# Patient Record
Sex: FEMALE | Race: OTHER | Employment: UNEMPLOYED | URBAN - NONMETROPOLITAN AREA
[De-identification: names, ages, dates, MRNs, and addresses within clinical notes are randomized per-mention and may not be internally consistent; named-entity substitution may affect disease eponyms.]

---

## 2022-08-10 ENCOUNTER — HOSPITAL ENCOUNTER (EMERGENCY)
Age: 37
Discharge: HOME | End: 2022-08-11
Attending: EMERGENCY MEDICINE
Payer: MEDICAID

## 2022-08-10 DIAGNOSIS — L08.9 INFECTED INSECT BITE OF LEFT FOREARM, INITIAL ENCOUNTER: Primary | ICD-10-CM

## 2022-08-10 DIAGNOSIS — S50.862A INFECTED INSECT BITE OF LEFT FOREARM, INITIAL ENCOUNTER: Primary | ICD-10-CM
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
## 2022-08-10 PROCEDURE — 99284 EMERGENCY DEPT VISIT MOD MDM: CPT

## 2022-08-10 PROCEDURE — 96372 THER/PROPH/DIAG INJ SC/IM: CPT

## 2022-08-10 RX ORDER — BUSPIRONE HYDROCHLORIDE 15 MG/1
15 TABLET ORAL 3 TIMES DAILY
COMMUNITY

## 2022-08-10 ASSESSMENT — PAIN - FUNCTIONAL ASSESSMENT: PAIN_FUNCTIONAL_ASSESSMENT: NONE - DENIES PAIN

## 2022-08-11 VITALS
DIASTOLIC BLOOD PRESSURE: 100 MMHG | TEMPERATURE: 97.8 F | BODY MASS INDEX: 34.15 KG/M2 | HEART RATE: 112 BPM | SYSTOLIC BLOOD PRESSURE: 150 MMHG | OXYGEN SATURATION: 96 % | HEIGHT: 64 IN | WEIGHT: 200 LBS | RESPIRATION RATE: 18 BRPM

## 2022-08-11 PROCEDURE — 6370000000 HC RX 637 (ALT 250 FOR IP): Performed by: EMERGENCY MEDICINE

## 2022-08-11 PROCEDURE — 6360000002 HC RX W HCPCS: Performed by: EMERGENCY MEDICINE

## 2022-08-11 RX ORDER — PREDNISONE 20 MG/1
40 TABLET ORAL DAILY
Qty: 10 TABLET | Refills: 0 | Status: SHIPPED | OUTPATIENT
Start: 2022-08-11 | End: 2022-08-16

## 2022-08-11 RX ORDER — DIPHENHYDRAMINE HCL 25 MG
25-50 TABLET ORAL EVERY 6 HOURS PRN
Qty: 30 TABLET | Refills: 0 | Status: SHIPPED | OUTPATIENT
Start: 2022-08-11 | End: 2022-08-18

## 2022-08-11 RX ORDER — DOXYCYCLINE HYCLATE 100 MG
100 TABLET ORAL 2 TIMES DAILY
Qty: 20 TABLET | Refills: 0 | Status: SHIPPED | OUTPATIENT
Start: 2022-08-11 | End: 2022-08-21

## 2022-08-11 RX ORDER — DIPHENHYDRAMINE HYDROCHLORIDE 50 MG/ML
50 INJECTION INTRAMUSCULAR; INTRAVENOUS ONCE
Status: COMPLETED | OUTPATIENT
Start: 2022-08-11 | End: 2022-08-11

## 2022-08-11 RX ORDER — LORATADINE 10 MG/1
10 TABLET ORAL DAILY
Qty: 30 TABLET | Refills: 0 | Status: SHIPPED | OUTPATIENT
Start: 2022-08-11 | End: 2022-08-21

## 2022-08-11 RX ORDER — DOXYCYCLINE HYCLATE 100 MG/1
100 CAPSULE ORAL ONCE
Status: COMPLETED | OUTPATIENT
Start: 2022-08-11 | End: 2022-08-11

## 2022-08-11 RX ORDER — DIPHENHYDRAMINE HYDROCHLORIDE 50 MG/ML
50 INJECTION INTRAMUSCULAR; INTRAVENOUS ONCE
Status: DISCONTINUED | OUTPATIENT
Start: 2022-08-11 | End: 2022-08-11

## 2022-08-11 RX ADMIN — DOXYCYCLINE HYCLATE 100 MG: 100 CAPSULE ORAL at 00:44

## 2022-08-11 RX ADMIN — PREDNISONE 50 MG: 20 TABLET ORAL at 00:44

## 2022-08-11 RX ADMIN — DIPHENHYDRAMINE HYDROCHLORIDE 50 MG: 50 INJECTION, SOLUTION INTRAMUSCULAR; INTRAVENOUS at 00:44

## 2022-08-11 ASSESSMENT — ENCOUNTER SYMPTOMS
SHORTNESS OF BREATH: 0
VOMITING: 0
COUGH: 0
VOICE CHANGE: 0
DIARRHEA: 0
EYE REDNESS: 0
ABDOMINAL PAIN: 0
EYE PAIN: 0
RHINORRHEA: 0

## 2022-08-11 NOTE — ED PROVIDER NOTES
[facility] EMERGENCY DEPT  EMERGENCY DEPARTMENT ENCOUNTER      Pt Name: Yoan Kline  MRN: [id number]  [place] 1985  Date of evaluation: 8/10/2022  Provider: Michelle Velasco MD    CHIEF COMPLAINT       Chief Complaint   Patient presents with    Insect Bite     On left forearm, pt noticed this morning. Pain and swelling has gotten worse. HISTORY OF PRESENT ILLNESS   (Location/Symptom, Timing/Onset,Context/Setting, Quality, Duration, Modifying Factors, Severity)  Note limiting factors. Yoan Kline is a 44 y.o. female who presents to the emergency department with complaint of redness, swelling, and itching to the left forearm. Says it was present when she woke up this morning. Started with a specific area that looked like an insect bite initially and is progressed since then. Has not had any fevers or other systemic symptoms. Denies any chronic medical history such as diabetes. HPI    NursingNotes were reviewed. REVIEW OF SYSTEMS    (2-9 systems for level 4, 10 or more for level 5)     Review of Systems   Constitutional:  Negative for fatigue and fever. HENT:  Negative for congestion, rhinorrhea and voice change. Eyes:  Negative for pain and redness. Respiratory:  Negative for cough and shortness of breath. Cardiovascular:  Negative for chest pain. Gastrointestinal:  Negative for abdominal pain, diarrhea and vomiting. Endocrine: Negative. Genitourinary: Negative. Musculoskeletal:  Negative for arthralgias and gait problem. Skin:  Positive for rash. Negative for wound. Neurological:  Negative for weakness and headaches. Hematological: Negative. Psychiatric/Behavioral: Negative. All other systems reviewed and are negative. A complete review of systems was performed and is negative except as noted above in the HPI. PAST MEDICAL HISTORY     Past Medical History:   Diagnosis Date    Anxiety     Depression          SURGICAL HISTORY     History reviewed. No pertinent surgical history. CURRENT MEDICATIONS       Discharge Medication List as of 8/11/2022 12:40 AM        CONTINUE these medications which have NOT CHANGED    Details   busPIRone (BUSPAR) 15 MG tablet Take 15 mg by mouth in the morning and 15 mg at noon and 15 mg before bedtime. Historical Med      buPROPion HCl (WELLBUTRIN PO) Take by mouthHistorical Med             ALLERGIES     Geodon [ziprasidone]    FAMILY HISTORY     History reviewed. No pertinent family history. SOCIAL HISTORY       Social History     Socioeconomic History    Marital status: Single     Spouse name: None    Number of children: None    Years of education: None    Highest education level: None   Tobacco Use    Smoking status: Every Day     Types: Cigarettes   Substance and Sexual Activity    Alcohol use: Yes    Drug use: Never       SCREENINGS    Cara Coma Scale  Eye Opening: Spontaneous  Best Verbal Response: Oriented  Best Motor Response: Obeys commands  Cara Coma Scale Score: 15        PHYSICAL EXAM    (up to 7 for level 4, 8 or more for level 5)     ED Triage Vitals [08/10/22 2358]   BP Temp Temp Source Heart Rate Resp SpO2 Height Weight   (!) 156/100 97.8 °F (36.6 °C) Temporal (!) 117 18 96 % 5' 4\" (1.626 m) 200 lb (90.7 kg)       Physical Exam  Vitals and nursing note reviewed. Constitutional:       General: She is not in acute distress. Appearance: She is well-developed. She is not toxic-appearing or diaphoretic. HENT:      Head: Normocephalic and atraumatic. Mouth/Throat:      Mouth: Mucous membranes are moist.      Pharynx: Oropharynx is clear. Eyes:      General: No scleral icterus. Right eye: No discharge. Left eye: No discharge. Pupils: Pupils are equal, round, and reactive to light. Cardiovascular:      Rate and Rhythm: Regular rhythm. Tachycardia present. Pulmonary:      Effort: Pulmonary effort is normal. No respiratory distress. Breath sounds: No stridor. Abdominal:      General: There is no distension. Musculoskeletal:         General: No deformity. Normal range of motion. Cervical back: Normal range of motion. Skin:     General: Skin is warm and dry. Comments: Approximately 7 to 8 cm of erythema to the left volar forearm with some excoriations. In the center there is an area that appears consistent with an insect bite with 2 distinct small punctate lesions [id number]. No palpable fluctuance or drainable fluid collection. Neurological:      General: No focal deficit present. Mental Status: She is alert and oriented to person, place, and time. GCS: GCS eye subscore is 4. GCS verbal subscore is 5. GCS motor subscore is 6. Cranial Nerves: No cranial nerve deficit. Motor: No abnormal muscle tone. Psychiatric:         Mood and Affect: Mood is anxious. Behavior: Behavior normal.         Thought Content: Thought content normal.         Judgment: Judgment normal.       DIAGNOSTIC RESULTS     EKG: All EKG's are interpreted by the Emergency Department Physician who either signs or Co-signs this chart in the absence of a cardiologist.      RADIOLOGY:   Non-plain film images such as CT, Ultrasound and MRI are read by the radiologist. Plainradiographic images are visualized and preliminarily interpreted by the emergency physician with the below findings:        Interpretation per the Radiologist below, if available at the time of this note:    No orders to display         ED BEDSIDE ULTRASOUND:   Performed by ED Physician - none    LABS:  Labs Reviewed - No data to display    All other labs were within normal range or not returned as of this dictation.     Medications   predniSONE (DELTASONE) tablet 50 mg (50 mg Oral Given 8/11/22 0044)   doxycycline hyclate (VIBRAMYCIN) capsule 100 mg (100 mg Oral Given 8/11/22 0044)   diphenhydrAMINE (BENADRYL) injection 50 mg (50 mg IntraMUSCular Given 8/11/22 0044)       EMERGENCY DEPARTMENT COURSE and DIFFERENTIALDIAGNOSIS/MDM:   Vitals:    Vitals:    08/10/22 2358 08/11/22 0051   BP: (!) 156/100 (!) 150/100   Pulse: (!) 117 (!) 112   Resp: 18 18   Temp: 97.8 °F (36.6 °C)    TempSrc: Temporal    SpO2: 96%    Weight: 200 lb (90.7 kg)    Height: 5' 4\" (1.626 m)        MDM    History and physical exam are actually consistent with a true spider bite. There is surrounding erythema and area is pruritic, suggesting more of an inflammatory/hypersensitivity response, though given timeframe and gradual worsening could also represent some component of developing cellulitis/infection. No signs of abscess or drainable fluid collection. Plan to treat simultaneously for both with antibiotics as well as steroids and antihistamines. Evaluation and work-up here revealed no signs of emergent or life-threatening pathology that would necessitate admission for further work-up or management at this time. Patient is felt to be stable for discharge home with return precautions for worsening of the condition or development of new concerning symptoms. Patient was encouraged to follow-up with their primary care doctor in the appropriate timeframe. Necessary prescriptions and information have been provided for treatment at home. Patient voices understanding and agreement with the plan. CONSULTS:  None    PROCEDURES:  Unless otherwise notedbelow, none     Procedures      FINAL IMPRESSION     1. Infected insect bite of left forearm, initial encounter          DISPOSITION/PLAN   DISPOSITION Decision To Discharge 08/11/2022 12:51:43 AM      No notes of EC Admission Criteria type on file.     PATIENT REFERRED TO:  [facility] EMERGENCY DEPT  Lazaro Rudd  [phone]    If symptoms worsen      DISCHARGE MEDICATIONS:  Discharge Medication List as of 8/11/2022 12:40 AM        START taking these medications    Details   predniSONE (DELTASONE) 20 MG tablet Take 2 tablets by mouth in the morning for 5 days., Disp-10 tablet, R-0Normal      doxycycline hyclate (VIBRA-TABS) 100 MG tablet Take 1 tablet by mouth in the morning and 1 tablet before bedtime. Do all this for 10 days. , Disp-20 tablet, R-0Normal      diphenhydrAMINE (BENADRYL ALLERGY) 25 MG tablet Take 1-2 tablets by mouth every 6 hours as needed for Itching, Disp-30 tablet, R-0Normal                (Please note that portions of this note were completed with a voice recognition program.  Efforts were made to edit the dictations butoccasionally words are mis-transcribed.)    Michelle Velasco MD (electronically signed)  AttendingEmergency Physician          Michelle Velasco., MD  08/11/22 3123

## 2022-08-12 ENCOUNTER — HOSPITAL ENCOUNTER (EMERGENCY)
Age: 37
Discharge: LEFT BEFORE DISCHARGE | End: 2022-08-12

## 2022-08-12 VITALS
OXYGEN SATURATION: 95 % | SYSTOLIC BLOOD PRESSURE: 146 MMHG | RESPIRATION RATE: 15 BRPM | HEART RATE: 115 BPM | DIASTOLIC BLOOD PRESSURE: 91 MMHG | TEMPERATURE: 98.3 F | BODY MASS INDEX: 34.15 KG/M2 | WEIGHT: 200 LBS | HEIGHT: 64 IN

## 2022-08-12 ASSESSMENT — PAIN - FUNCTIONAL ASSESSMENT: PAIN_FUNCTIONAL_ASSESSMENT: 0-10

## 2022-08-12 ASSESSMENT — PAIN SCALES - GENERAL: PAINLEVEL_OUTOF10: 7

## 2022-08-12 ASSESSMENT — PAIN DESCRIPTION - DESCRIPTORS: DESCRIPTORS: TIGHTNESS

## 2022-08-12 ASSESSMENT — PAIN DESCRIPTION - LOCATION: LOCATION: FOOT

## 2022-08-12 ASSESSMENT — PAIN DESCRIPTION - ORIENTATION: ORIENTATION: RIGHT;LEFT

## 2022-08-14 ENCOUNTER — APPOINTMENT (OUTPATIENT)
Dept: GENERAL RADIOLOGY | Facility: HOSPITAL | Age: 37
End: 2022-08-14

## 2022-08-14 ENCOUNTER — HOSPITAL ENCOUNTER (EMERGENCY)
Facility: HOSPITAL | Age: 37
Discharge: HOME | End: 2022-08-14
Attending: STUDENT IN AN ORGANIZED HEALTH CARE EDUCATION/TRAINING PROGRAM | Admitting: STUDENT IN AN ORGANIZED HEALTH CARE EDUCATION/TRAINING PROGRAM

## 2022-08-14 VITALS
RESPIRATION RATE: 18 BRPM | BODY MASS INDEX: 32.44 KG/M2 | TEMPERATURE: 98.8 F | DIASTOLIC BLOOD PRESSURE: 78 MMHG | SYSTOLIC BLOOD PRESSURE: 118 MMHG | WEIGHT: 190 LBS | HEART RATE: 94 BPM | OXYGEN SATURATION: 100 % | HEIGHT: 64 IN

## 2022-08-14 DIAGNOSIS — F17.200 SMOKER: ICD-10-CM

## 2022-08-14 DIAGNOSIS — R07.9 CHEST PAIN, UNSPECIFIED TYPE: ICD-10-CM

## 2022-08-14 DIAGNOSIS — N30.00 ACUTE CYSTITIS WITHOUT HEMATURIA: ICD-10-CM

## 2022-08-14 DIAGNOSIS — R06.02 SHORTNESS OF BREATH: ICD-10-CM

## 2022-08-14 DIAGNOSIS — M79.89 LEG SWELLING: Primary | ICD-10-CM

## 2022-08-14 LAB
ANION GAP SERPL CALCULATED.3IONS-SCNC: 12 MMOL/L (ref 5–15)
B-HCG UR QL: NEGATIVE
BACTERIA UR QL AUTO: ABNORMAL /HPF
BILIRUB UR QL STRIP: NEGATIVE
BUN SERPL-MCNC: 26 MG/DL (ref 6–20)
BUN/CREAT SERPL: 28.6 (ref 7–25)
CALCIUM SPEC-SCNC: 9.1 MG/DL (ref 8.6–10.5)
CHLORIDE SERPL-SCNC: 99 MMOL/L (ref 98–107)
CLARITY UR: ABNORMAL
CO2 SERPL-SCNC: 25 MMOL/L (ref 22–29)
COLOR UR: YELLOW
CREAT SERPL-MCNC: 0.91 MG/DL (ref 0.57–1)
DEPRECATED RDW RBC AUTO: 45.1 FL (ref 37–54)
EGFRCR SERPLBLD CKD-EPI 2021: 83 ML/MIN/1.73
ERYTHROCYTE [DISTWIDTH] IN BLOOD BY AUTOMATED COUNT: 13.8 % (ref 12.3–15.4)
GLUCOSE SERPL-MCNC: 102 MG/DL (ref 65–99)
GLUCOSE UR STRIP-MCNC: NEGATIVE MG/DL
HCT VFR BLD AUTO: 36.4 % (ref 34–46.6)
HGB BLD-MCNC: 12.3 G/DL (ref 12–15.9)
HGB UR QL STRIP.AUTO: NEGATIVE
HYALINE CASTS UR QL AUTO: ABNORMAL /LPF
KETONES UR QL STRIP: NEGATIVE
LEUKOCYTE ESTERASE UR QL STRIP.AUTO: ABNORMAL
LYMPHOCYTES # BLD MANUAL: 5.57 10*3/MM3 (ref 0.7–3.1)
LYMPHOCYTES NFR BLD MANUAL: 1 % (ref 5–12)
MCH RBC QN AUTO: 30.3 PG (ref 26.6–33)
MCHC RBC AUTO-ENTMCNC: 33.8 G/DL (ref 31.5–35.7)
MCV RBC AUTO: 89.7 FL (ref 79–97)
MONOCYTES # BLD: 0.11 10*3/MM3 (ref 0.1–0.9)
NEUTROPHILS # BLD AUTO: 5.8 10*3/MM3 (ref 1.7–7)
NEUTROPHILS NFR BLD MANUAL: 50.5 % (ref 42.7–76)
NEUTS VAC BLD QL SMEAR: ABNORMAL
NITRITE UR QL STRIP: NEGATIVE
NT-PROBNP SERPL-MCNC: 35.3 PG/ML (ref 0–450)
PH UR STRIP.AUTO: 6 [PH] (ref 5–8)
PLAT MORPH BLD: NORMAL
PLATELET # BLD AUTO: 255 10*3/MM3 (ref 140–450)
PMV BLD AUTO: 10.1 FL (ref 6–12)
POTASSIUM SERPL-SCNC: 3.4 MMOL/L (ref 3.5–5.2)
PROT UR QL STRIP: ABNORMAL
RBC # BLD AUTO: 4.06 10*6/MM3 (ref 3.77–5.28)
RBC # UR STRIP: ABNORMAL /HPF
RBC MORPH BLD: NORMAL
REF LAB TEST METHOD: ABNORMAL
SODIUM SERPL-SCNC: 136 MMOL/L (ref 136–145)
SP GR UR STRIP: 1.02 (ref 1–1.03)
SQUAMOUS #/AREA URNS HPF: ABNORMAL /HPF
TROPONIN T SERPL-MCNC: <0.01 NG/ML (ref 0–0.03)
UROBILINOGEN UR QL STRIP: ABNORMAL
VARIANT LYMPHS NFR BLD MANUAL: 48.5 % (ref 19.6–45.3)
WBC # UR STRIP: ABNORMAL /HPF
WBC NRBC COR # BLD: 11.48 10*3/MM3 (ref 3.4–10.8)

## 2022-08-14 PROCEDURE — 71046 X-RAY EXAM CHEST 2 VIEWS: CPT

## 2022-08-14 PROCEDURE — 93005 ELECTROCARDIOGRAM TRACING: CPT

## 2022-08-14 PROCEDURE — 81025 URINE PREGNANCY TEST: CPT | Performed by: STUDENT IN AN ORGANIZED HEALTH CARE EDUCATION/TRAINING PROGRAM

## 2022-08-14 PROCEDURE — 83880 ASSAY OF NATRIURETIC PEPTIDE: CPT | Performed by: STUDENT IN AN ORGANIZED HEALTH CARE EDUCATION/TRAINING PROGRAM

## 2022-08-14 PROCEDURE — 85025 COMPLETE CBC W/AUTO DIFF WBC: CPT | Performed by: STUDENT IN AN ORGANIZED HEALTH CARE EDUCATION/TRAINING PROGRAM

## 2022-08-14 PROCEDURE — 81001 URINALYSIS AUTO W/SCOPE: CPT | Performed by: STUDENT IN AN ORGANIZED HEALTH CARE EDUCATION/TRAINING PROGRAM

## 2022-08-14 PROCEDURE — 85007 BL SMEAR W/DIFF WBC COUNT: CPT | Performed by: STUDENT IN AN ORGANIZED HEALTH CARE EDUCATION/TRAINING PROGRAM

## 2022-08-14 PROCEDURE — 80048 BASIC METABOLIC PNL TOTAL CA: CPT | Performed by: STUDENT IN AN ORGANIZED HEALTH CARE EDUCATION/TRAINING PROGRAM

## 2022-08-14 PROCEDURE — 84484 ASSAY OF TROPONIN QUANT: CPT | Performed by: STUDENT IN AN ORGANIZED HEALTH CARE EDUCATION/TRAINING PROGRAM

## 2022-08-14 PROCEDURE — 93010 ELECTROCARDIOGRAM REPORT: CPT | Performed by: INTERNAL MEDICINE

## 2022-08-14 PROCEDURE — 93005 ELECTROCARDIOGRAM TRACING: CPT | Performed by: STUDENT IN AN ORGANIZED HEALTH CARE EDUCATION/TRAINING PROGRAM

## 2022-08-14 PROCEDURE — 99283 EMERGENCY DEPT VISIT LOW MDM: CPT

## 2022-08-14 RX ORDER — NITROFURANTOIN 25; 75 MG/1; MG/1
100 CAPSULE ORAL 2 TIMES DAILY
Qty: 10 CAPSULE | Refills: 0 | Status: SHIPPED | OUTPATIENT
Start: 2022-08-14 | End: 2022-08-19

## 2022-08-14 RX ORDER — BUSPIRONE HYDROCHLORIDE 15 MG/1
15 TABLET ORAL 2 TIMES DAILY
COMMUNITY

## 2022-08-14 RX ORDER — DIPHENHYDRAMINE HCL 25 MG
25 CAPSULE ORAL EVERY 6 HOURS PRN
COMMUNITY

## 2022-08-14 NOTE — DISCHARGE INSTRUCTIONS
Today you are seen for your symptoms your work-up is reassuring.  Urine does have evidence of urinary tract infection so please take the prescribed Macrobid twice a day over the next 5 days.  Is importantly follow-up with a primary care provider if you do not have a primary care provider please follow-up with the primary care provider listed below.  If your symptoms worsen prior please return to the emergency department immediately.    Follow up with one of the [facility] physician groups below to setup primary care. If you have trouble making an appointment, please call the [facility] Nurse Line at [phone]    Dr. Sirisha Parson DO, Dr. Jorge Esqueda DO, and Kaitlyn Sandra, NIKO  [facility]  [address] [place] [address], [address] [phone]    Dr. Leonard Marroquin MD  [facility] - [address] [place] [address], [address], [place] [address] [phone]    Dr. Toni Ash DO, Dr. Seth Frias DO,  NIKO Anton, and NIKO Weiner  [facility] - [address] [address], [address], [place] [address] [phone]     Dr. Emily Larsen MD, and Meenakshi Jeffries, NIKO  [facility] [address], [place], [place] [id number] [phone]    Dr. Roldan Shaffer MD and Dr. Froy Pathak MD  [facility] [place], [facility]  [address], [address]  [phone]    Dr. Jaren Jorge MD  [facility]  [address] [place] [address], [facility], [place], [place], [address] [phone]    Dr. Wilfredo Álvarez MD  [facility] - [place]  [address], [place], [address] [phone]

## 2022-08-14 NOTE — ED PROVIDER NOTES
"EMERGENCY DEPARTMENT HISTORY AND PHYSICAL EXAM    Patient Name: Rosibel Bermudez    Chief Complaint   Patient presents with   • Chest Pain   • Edema       History of Presenting Illness:  Rosibel Bermudez is a 37 y.o. female with no significant past medical who presents to the emergency department due to multiple complaints including leg swelling shortness of breath and chest pain.    Patient states that she has noticed increasing leg swelling for about the past 3 days.  With that she has noticed some shortness of breath.  She has no history of kidney or heart issues per her report.  She also complains of mild chest pain on review of systems although she not mention during her initial interview.  She is never had similar leg swelling before.  She is not on any medications.  Denies any significant dyspnea on exertion.  Denies nausea or vomiting.  Denies any radiation of her chest pain stated retrosternal nature described as mild pain denies pressure.  No history of IV drug use.  Patient is a smoker.      Past Medical History:   Denies significant past medical history    Past Surgical History:   No prior surgeries    Family History:  Denies family history of early cardiac death  Denies family history of myocardial infarction    Social History:   Daily tobacco  Denies EtOH  Denies marijuana, cocaine, or IV drugs     Allergies:   Allergies   Allergen Reactions   • Geodon [Ziprasidone] Anaphylaxis       Medications:  Previously taking Buspar, currently no home medications    Review of Systems:  A full review of systems was obtained and is negative unless otherwise stated in HPI.    Physical Exam:  VS: /78 (BP Location: Right arm, Patient Position: Sitting)   Pulse 94   Temp 98.8 °F (37.1 °C) (Oral)   Resp 18   Ht 162.6 cm (64\")   Wt 86.2 kg (190 lb)   LMP 08/06/2022   SpO2 100%   BMI 32.61 kg/m²   GENERAL: Well-appearing young woman sitting up in stretcher no acute distress; well nourished, " well developed, awake, alert, no acute distress, nontoxic appearing, comfortable  EYES: PERRL, sclera anicteric, extra-occular movements grossly intact, symmetric lids  EARS, NOSE, MOUTH, THROAT: atraumatic external nose and ears, moist mucous membranes  NECK: Symmetric, trachea midline, no thyromegaly, no adenopathy, no meningismus  RESPIRATORY: No inspiratory expiratory stridor; no inspiratory or expiratory wheezing; unlabored respiratory effort, clear to auscultation bilaterally, good air movement  CARDIOVASCULAR: No murmurs or gallops, peripheral pulses 2+ and equal in all extremities  GI: Soft, nontender, nondistended, bowel sounds present, no hepatosplenomegaly  LYMPHATIC: no lymphadenopathy  MUSCULOSKELETAL/EXTREMITIES: Nonpitting lower extremity edema; no notable upper extremity edema; otherwise extremities without obvious deformity, no cyanosis or clubbing  SKIN: warm and dry with no obvious rashes  NEUROLOGIC: moving all 4 extremities symmetrically, CN II-XII grossly intact  PSYCHIATRIC: alert, pleasant and cooperative. Appropriate mood and affect.      Labs:   Labs Reviewed   URINALYSIS W/ MICROSCOPIC IF INDICATED (NO CULTURE) - Abnormal; Notable for the following components:       Result Value    Appearance, UA Cloudy (*)     Protein, UA 30 mg/dL (1+) (*)     Leuk Esterase, UA Moderate (2+) (*)     All other components within normal limits   CBC WITH AUTO DIFFERENTIAL - Abnormal; Notable for the following components:    WBC 11.48 (*)     All other components within normal limits    Narrative:     The previously reported component NRBC is no longer being reported. Previous result was 0.0 /100 WBC (Reference Range: 0.0-0.2 /100 WBC) on 8/14/2022 at 1436 CDT.   MANUAL DIFFERENTIAL - Abnormal; Notable for the following components:    Lymphocyte % 48.5 (*)     Monocyte % 1.0 (*)     Lymphocytes Absolute 5.57 (*)     All other components within normal limits   BASIC METABOLIC PANEL - Abnormal; Notable for the  following components:    Glucose 102 (*)     BUN 26 (*)     Potassium 3.4 (*)     BUN/Creatinine Ratio 28.6 (*)     All other components within normal limits    Narrative:     GFR Normal >60  Chronic Kidney Disease <60  Kidney Failure <15     URINALYSIS, MICROSCOPIC ONLY - Abnormal; Notable for the following components:    RBC, UA 0-2 (*)     WBC, UA 6-12 (*)     Bacteria, UA 2+ (*)     Squamous Epithelial Cells, UA 13-20 (*)     All other components within normal limits   PREGNANCY, URINE - Normal   BNP (IN-HOUSE) - Normal    Narrative:     Among patients with dyspnea, NT-proBNP is highly sensitive for the detection of acute congestive heart failure. In addition NT-proBNP of <300 pg/ml effectively rules out acute congestive heart failure with 99% negative predictive value.    Results may be falsely decreased if patient taking Biotin.     TROPONIN (IN-HOUSE) - Normal    Narrative:     Troponin T Reference Range:  <= 0.03 ng/mL-   Negative for AMI  >0.03 ng/mL-     Abnormal for myocardial necrosis.  Clinicians would have to utilize clinical acumen, EKG, Troponin and serial changes to determine if it is an Acute Myocardial Infarction or myocardial injury due to an underlying chronic condition.       Results may be falsely decreased if patient taking Biotin.     CBC AND DIFFERENTIAL    Narrative:     The following orders were created for panel order CBC & Differential.  Procedure                               Abnormality         Status                     ---------                               -----------         ------                     CBC Auto Differential[[id number]]        Abnormal            Final result                 Please view results for these tests on the individual orders.         Radiology:   XR Chest 2 View   Final Result   No active disease is seen.            This report was finalized on 08/14/2022 15:43 by Dr. Cortez Nicole MD.          Medical Decision Making:  Rosibel Bermudez is a 37  y.o. female with no significant past medical who presents emerged department due to multiple complaints including leg swelling as well as with shortness of breath and chest pain.    Initial vital signs reassuring no hypoxia no tachycardia afebrile no hypotension.     HEART score of 0.  Patient with no risk factors no age.  Patient did not even mention chest pain during my interview until I brought it up that because it was in the triage note.    I have reviewed and interpreted the EKG and it shows: NSR, rate of 90. Normal axis and intervals. No signs of acute ischemia such as ST elevation, ST depression, or T wave inversion.  Multiple J-point's including V4 through V6 as well as 2 through aVF which are consistent with normal repolarization abnormalities given patient's age.  No signs of Hyperkalemia, WPW, PE, Pericarditis, LV aneurysm, Brugada, Heart Block, Atrial fibrillation or A flutter.    Labs were ordered and reviewed.  Urinalysis with 6-12 white blood cells and 2+ bacteria consistent with asymptomatic bacteria.  CBC with slight elevated white blood count 11.48.  Normal hemoglobin.  BMP with only slight hypokalemia 3.4.    Imaging was ordered and reviewed.  Chest x-ray unremarkable.    Nursing notes were reviewed.    Unclear exact etiology the patient's symptoms.  Her leg swelling is likely nonpitting edema likely in the setting of valvular compromise.  She has no evidence of any kidney injury or heart failure that would be consistent with other causes of leg swelling.  No signs of erythema or any signs of cellulitis or infection.  Evidence of asymptomatic bacteriuria will treat with Macrobid prescription for 5 days.  Repeat shortness of breath no evidence of full consolidation pneumonia no evidence pneumothorax.  Wells low risk for pulmonary embolism.  I doubt acute coronary syndrome given heart score 0.    Patient was discharged home with plan to follow with her primary care provider within 2 days for  further management and return to the emergency department if symptoms acutely worsen.      ED Diagnosis:  Leg swelling; Shortness of breath; Chest pain, unspecified type; Smoker; Acute cystitis without hematuria      Disposition: to home    Follow up plan: PCP follow up within 2 days, return to ED immediately if symptoms worsen        Signed:  Donavan Sky MD  Emergency Medicine Physician    This note was generated using speech recognition software and may contain homophonic word substitutions or errors.     Donavan Sky MD  08/14/22 5217

## 2022-08-15 LAB
QT INTERVAL: 378 MS
QTC INTERVAL: 462 MS

## 2022-08-18 ENCOUNTER — LAB (OUTPATIENT)
Dept: LAB | Facility: HOSPITAL | Age: 37
End: 2022-08-18

## 2022-08-18 ENCOUNTER — OFFICE VISIT (OUTPATIENT)
Dept: INTERNAL MEDICINE | Facility: CLINIC | Age: 37
End: 2022-08-18

## 2022-08-18 VITALS
WEIGHT: 199 LBS | DIASTOLIC BLOOD PRESSURE: 98 MMHG | HEART RATE: 90 BPM | BODY MASS INDEX: 33.97 KG/M2 | OXYGEN SATURATION: 99 % | HEIGHT: 64 IN | SYSTOLIC BLOOD PRESSURE: 152 MMHG

## 2022-08-18 DIAGNOSIS — R60.0 BILATERAL LOWER EXTREMITY EDEMA: ICD-10-CM

## 2022-08-18 DIAGNOSIS — R60.0 BILATERAL LOWER EXTREMITY EDEMA: Primary | ICD-10-CM

## 2022-08-18 DIAGNOSIS — R07.9 CHEST PAIN, UNSPECIFIED TYPE: ICD-10-CM

## 2022-08-18 DIAGNOSIS — I10 HYPERTENSION, UNSPECIFIED TYPE: ICD-10-CM

## 2022-08-18 LAB
D DIMER PPP FEU-MCNC: <0.27 MCGFEU/ML (ref 0–0.5)
TSH SERPL DL<=0.05 MIU/L-ACNC: 1.47 UIU/ML (ref 0.27–4.2)

## 2022-08-18 PROCEDURE — 36415 COLL VENOUS BLD VENIPUNCTURE: CPT

## 2022-08-18 PROCEDURE — 84443 ASSAY THYROID STIM HORMONE: CPT

## 2022-08-18 PROCEDURE — 99204 OFFICE O/P NEW MOD 45 MIN: CPT | Performed by: NURSE PRACTITIONER

## 2022-08-18 PROCEDURE — 85379 FIBRIN DEGRADATION QUANT: CPT

## 2022-08-18 RX ORDER — HYDROCHLOROTHIAZIDE 12.5 MG/1
12.5 TABLET ORAL DAILY
Qty: 20 TABLET | Refills: 0 | Status: SHIPPED | OUTPATIENT
Start: 2022-08-18

## 2022-08-18 RX ORDER — DOXYCYCLINE HYCLATE 100 MG
100 TABLET ORAL
COMMUNITY
Start: 2022-08-11 | End: 2022-08-22

## 2022-08-18 RX ORDER — PREDNISONE 20 MG/1
20 TABLET ORAL 2 TIMES DAILY
COMMUNITY
End: 2022-08-18

## 2022-08-18 RX ORDER — LORATADINE 10 MG/1
10 TABLET ORAL
COMMUNITY
Start: 2022-08-11 | End: 2022-08-22

## 2022-08-18 NOTE — PROGRESS NOTES
Chief Complaint   Patient presents with   • Establish Care   • Hospital Follow Up Visit         History:  Rosibel Bermudez is a 37 y.o. female who presents today for evaluation of the above problems.          Here to establish with PCP.      Seen in ER 8/14/22 with leg swelling and chest pain. I have reviewed the CXR and lab results, as well as the provider note.  She was diagnosed with UTI and started on Macrobid. She has not gotten this yet from her pharmacy.  Her blood pressure was normal at that visit.    She went to [facility] ER on 8/11/22 prior to the above ER visit.  She was given Prednisone and doxycycline for insect bite on forearm.  She says she started taking this medication as directed, but she does still have some Prednisone and doxycycline left in her medications that she brings today. I reviewed that ER note as well.    A week ago she noticed her whole body got puffy, legs and arms, stomach, even breasts.  That is why she went to the ER on 8/14/22.  She tells me that she had surgery as a child on her left knee, and her left lower leg has always been larger than the right.  She has not really had leg pain.  She does feel pain in her chest with deep breath.  No chest pain that is brought on with exertion.  No shortness of breath that has made her feel she needs to be back in the ER. She does have anxiety and takes Buspar.    She was recently in inpatient facility for 30 days for drug and alcohol rehab and states she is trying to stay clean.  Has been out a few weeks.      ROS:  Review of Systems  As above    Allergies   Allergen Reactions   • Geodon [Ziprasidone] Anaphylaxis     Past Medical History:   Diagnosis Date   • Anxiety    • Depression      Past Surgical History:   Procedure Laterality Date   • KNEE ARTHROPLASTY Left      Family History   Problem Relation Age of Onset   • Cancer Mother    • Alcohol abuse Mother    • Alcohol abuse Father    • Alcohol abuse Sister    • Hypertension Paternal  "Grandmother      Rosibel  reports that she has been smoking cigarettes. She has a 23.00 pack-year smoking history. She has never used smokeless tobacco. She reports current alcohol use. She reports previous drug use.    I have reviewed and updated the above documentation (if necessary) including but not limited to chief complaint, ROS, PFSH, allergies and medications        Current Outpatient Medications:   •  busPIRone (BUSPAR) 15 MG tablet, Take 15 mg by mouth 2 (Two) Times a Day., Disp: , Rfl:   •  diphenhydrAMINE (BENADRYL) 25 mg capsule, Take 25 mg by mouth Every 6 (Six) Hours As Needed for Itching., Disp: , Rfl:   •  doxycycline (VIBRAMYICN) 100 MG tablet, Take 100 mg by mouth., Disp: , Rfl:   •  loratadine (CLARITIN) 10 MG tablet, Take 10 mg by mouth., Disp: , Rfl:   •  hydroCHLOROthiazide (HYDRODIURIL) 12.5 MG tablet, Take 1 tablet by mouth Daily., Disp: 20 tablet, Rfl: 0  •  nitrofurantoin, macrocrystal-monohydrate, (MACROBID) 100 MG capsule, Take 1 capsule by mouth 2 (Two) Times a Day for 5 days., Disp: 10 capsule, Rfl: 0    OBJECTIVE:  Visit Vitals  /98 (BP Location: Right arm, Patient Position: Sitting, Cuff Size: Adult)   Pulse 90   Ht 162.6 cm (64\")   Wt 90.3 kg (199 lb)   LMP 08/06/2022   SpO2 99%   BMI 34.16 kg/m²      Physical Exam  Vitals and nursing note reviewed.   Constitutional:       General: She is not in acute distress.     Appearance: Normal appearance. She is not ill-appearing, toxic-appearing or diaphoretic.      Comments: Conversational, no distress.    HENT:      Head: Normocephalic and atraumatic.   Eyes:      General: No scleral icterus.  Neck:      Vascular: No carotid bruit.      Comments: No thyromegaly or mass appreciated.  Cardiovascular:      Rate and Rhythm: Normal rate and regular rhythm.   Pulmonary:      Effort: Pulmonary effort is normal.      Breath sounds: Normal breath sounds.   Abdominal:      General: There is no distension.      Palpations: Abdomen is soft. There " is no mass.      Tenderness: There is no abdominal tenderness.      Hernia: No hernia is present.      Comments: No ascites or edema   Musculoskeletal:      Cervical back: Neck supple.      Right lower leg: No edema.      Left lower leg: No edema.      Comments: Calves are soft, feet examined without shoes/socks.  No pitting.  Positive Gabriela's sign on left.  Left calf is noticeable larger than right, though without edema.  This is chronic per patient (see HPI).    Excellent DP pulses bilaterally, and feet are warm.   Lymphadenopathy:      Cervical: No cervical adenopathy.   Skin:     General: Skin is warm and dry.   Neurological:      Mental Status: She is alert and oriented to person, place, and time.   Psychiatric:         Mood and Affect: Mood normal.         Behavior: Behavior normal.         Thought Content: Thought content normal.         Judgment: Judgment normal.         [facility]    Assessment/Plan    Diagnoses and all orders for this visit:    1. Bilateral lower extremity edema (Primary)  -     TSH; Future  -     US venous doppler lower extremity bilateral (duplex); Future  -     hydroCHLOROthiazide (HYDRODIURIL) 12.5 MG tablet; Take 1 tablet by mouth Daily.  Dispense: 20 tablet; Refill: 0    2. Chest pain, unspecified type  -     D-dimer, Quantitative; Future    3. Hypertension, unspecified type  -     hydroCHLOROthiazide (HYDRODIURIL) 12.5 MG tablet; Take 1 tablet by mouth Daily.  Dispense: 20 tablet; Refill: 0    I do not see pitting edema anywhere on today's exam.  It is in my differential that she experienced some diffuse puffiness related to the oral steroids that she was prescribed for insect bite on 8/11/22; however, since she has reported chest pain with inspiration as well as legs swelling acutely in the last week, I would like to check D-dimer and US venous BLE to rule out DVT/PE.      I will go ahead and initiate HCTZ 12.5 mg daily for complaint of edema as well as elevated blood pressure today. Will  have her follow up in 2 weeks for re-check blood pressure and BMP.    Education materials and an After Visit Summary were printed and given to the patient at discharge.  Return in about 2 weeks (around 9/1/2022) for Recheck.         NIKO Manriquez   09:18 CDT  8/18/2022